# Patient Record
Sex: FEMALE | Race: ASIAN | NOT HISPANIC OR LATINO | URBAN - METROPOLITAN AREA
[De-identification: names, ages, dates, MRNs, and addresses within clinical notes are randomized per-mention and may not be internally consistent; named-entity substitution may affect disease eponyms.]

---

## 2021-12-21 ENCOUNTER — OFFICE VISIT (OUTPATIENT)
Dept: FAMILY MEDICINE | Facility: CLINIC | Age: 41
End: 2021-12-21
Payer: MEDICAID

## 2021-12-21 VITALS
TEMPERATURE: 98.3 F | DIASTOLIC BLOOD PRESSURE: 112 MMHG | WEIGHT: 141 LBS | HEIGHT: 61 IN | RESPIRATION RATE: 18 BRPM | OXYGEN SATURATION: 96 % | HEART RATE: 70 BPM | SYSTOLIC BLOOD PRESSURE: 159 MMHG | BODY MASS INDEX: 26.62 KG/M2

## 2021-12-21 DIAGNOSIS — H57.9 ITCH OF LEFT EYE: ICD-10-CM

## 2021-12-21 DIAGNOSIS — I10 HYPERTENSION, UNSPECIFIED TYPE: ICD-10-CM

## 2021-12-21 DIAGNOSIS — M53.3 PAIN IN THE COCCYX: ICD-10-CM

## 2021-12-21 DIAGNOSIS — H72.92 PERFORATION OF TYMPANIC MEMBRANE, LEFT: Primary | ICD-10-CM

## 2021-12-21 LAB
ANION GAP SERPL CALCULATED.3IONS-SCNC: 10 MMOL/L (ref 5–18)
BUN SERPL-MCNC: 10 MG/DL (ref 8–22)
CALCIUM SERPL-MCNC: 8.8 MG/DL (ref 8.5–10.5)
CHLORIDE BLD-SCNC: 105 MMOL/L (ref 98–107)
CO2 SERPL-SCNC: 24 MMOL/L (ref 22–31)
CREAT SERPL-MCNC: 0.64 MG/DL (ref 0.6–1.1)
GFR SERPL CREATININE-BSD FRML MDRD: >90 ML/MIN/1.73M2
GLUCOSE BLD-MCNC: 91 MG/DL (ref 70–125)
POTASSIUM BLD-SCNC: 3.4 MMOL/L (ref 3.5–5)
SODIUM SERPL-SCNC: 139 MMOL/L (ref 136–145)

## 2021-12-21 PROCEDURE — 99204 OFFICE O/P NEW MOD 45 MIN: CPT | Mod: GC | Performed by: STUDENT IN AN ORGANIZED HEALTH CARE EDUCATION/TRAINING PROGRAM

## 2021-12-21 PROCEDURE — 80048 BASIC METABOLIC PNL TOTAL CA: CPT | Performed by: STUDENT IN AN ORGANIZED HEALTH CARE EDUCATION/TRAINING PROGRAM

## 2021-12-21 PROCEDURE — 36415 COLL VENOUS BLD VENIPUNCTURE: CPT | Performed by: STUDENT IN AN ORGANIZED HEALTH CARE EDUCATION/TRAINING PROGRAM

## 2021-12-21 RX ORDER — ACETAMINOPHEN 325 MG/1
325-650 TABLET ORAL EVERY 6 HOURS PRN
Qty: 90 TABLET | Refills: 1 | Status: SHIPPED | OUTPATIENT
Start: 2021-12-21 | End: 2022-03-21

## 2021-12-21 RX ORDER — LOSARTAN POTASSIUM AND HYDROCHLOROTHIAZIDE 12.5; 5 MG/1; MG/1
1 TABLET ORAL DAILY
Qty: 30 TABLET | Refills: 0 | Status: SHIPPED | OUTPATIENT
Start: 2021-12-21 | End: 2022-03-01

## 2021-12-21 RX ORDER — LOSARTAN POTASSIUM 25 MG/1
25 TABLET ORAL DAILY
Qty: 30 TABLET | Refills: 0 | Status: CANCELLED | OUTPATIENT
Start: 2021-12-21

## 2021-12-21 RX ORDER — IBUPROFEN 200 MG
200-400 TABLET ORAL EVERY 4 HOURS PRN
Qty: 90 TABLET | Refills: 1 | Status: SHIPPED | OUTPATIENT
Start: 2021-12-21 | End: 2022-03-21

## 2021-12-21 NOTE — PATIENT INSTRUCTIONS
Thank you for discussing your health with us today!    We discussed the following during your visit:    1. Ear pain: I have sent you to an ear   2. Eye itching/draining: try the eye drop for the itching. I think you have a blocked duct   3. Back Pain: You have a bruised tailbone. This should get better in the next week or so. Try sitting on donut cushions  4. Blood pressure: your blood pressure is very high. Please start taking this blood pressure medication every day    Please make an appointment in 3-4 weeks to follow up on blood pressure.    As always, please call the clinic if you have any questions or concerns.    Patient Education     Understanding Coccydynia    Coccydynia is pain at the lowest tip of the spine (the coccyx, or tailbone). This is sometimes called a  bruised tailbone.  Tailbone pain can be very uncomfortable. It can also interfere with daily activities, such as driving.    How to say it  giovanni si DIN ee uh  What causes coccydynia?   Causes of tailbone pain include:    Injury to the tailbone from a blow or fall    A bone spur on the tailbone    Poor posture while sitting    Sitting for a long time in an uncomfortable position    Vaginal childbirth    Arthritis  In some cases, the cause of the pain can t be found.   Symptoms of coccydynia   You may feel:     A dull ache or sharp pain in the tailbone area, near the top of the buttocks    Muscle spasms in the lower back or pelvic area    A sense of pressure in the rectum  Pain may be triggered by things like walking or standing up from sitting. It may hurt more if you sit for long periods. Things that put pressure on the tailbone, such as riding a horse or having sex, may also trigger the pain.   Treatment for coccydynia   Tailbone pain often goes away by itself within a few months. Treatment focuses on reducing pain and protecting the tailbone. Treatments can include:     Over-the-counter or prescription medicine to help relieve pain and swelling.  NSAIDs (nonsteroidal anti-inflammatory drugs) are the most common medicines used. Medicines may be prescribed or bought over the counter. They may be given as pills. Or they may be put on the skin as a gel, cream, or patch.    Warm or cold to help relieve symptoms for a time, such as a heating pad, hot water bottle, or ice pack    Keeping pressure off the tailbone to help the area heal by shifting weight forward onto your hipbones and thighs when sitting or sitting on a special cushion    Medicine injected into the area to help relieve severe symptoms. The medicine is usually a corticosteroid. This is a strong anti-inflammatory medicine.    Physical therapy to help strengthen the structures around the tailbone  If no other treatments work, you may need surgery to remove all or part of the coccyx.   When to call your healthcare provider   Call your healthcare provider right away if you have any of these:     Pain that continues for more than 2 months or gets worse    Pain that limits your usual activities    Pain that doesn t get better by trying the self-care treatments described above    Fever of 100.4 F (38 C) or higher, or as directed by your provider    Chills    Redness or swelling    A new sore in the cleft of the buttocks, especially one that contains or drains pus    Other new symptoms  [employer] last reviewed this educational content on 6/1/2019 2000-2021 [employer]. All rights reserved. This information is not intended as a substitute for professional medical care. Always follow your healthcare professional's instructions.             12/21/21   OTOLARYNGOLOGY REFERRAL     [facility]  Phone: [phone]  Fax: [phone]    [facility]  [address] [place] [address], [address]  [place] [address]    Appointment:  01/19/2022  Arrival Time:  10:05AM  Provider:  Dr. Enriquez    Please bring a copy of your insurance card and photo ID    If you cannot make  this appointment, please call [phone] to reschedule    Referral, demographics and office note faxed to [phone].     Radha Vance

## 2021-12-21 NOTE — PROGRESS NOTES
Preceptor Attestation:    I discussed the patient with the resident and evaluated the patient in person. I have verified the content of the note, which accurately reflects my assessment of the patient and the plan of care.   Supervising Physician:  Phil Moore MD.

## 2021-12-21 NOTE — PROGRESS NOTES
Assessment & Plan     Perforation of tympanic membrane, left  Likely chronic with drainage. Clear hearing loss. Will not treat with antibiotics given chronicity.   - Otolaryngology Referral; Future    Hypertension, unspecified type  Severe range blood pressure readings, will diagnose with hypertension despite first time elevated reading. Will recheck BM in 3 weeks.  - Basic metabolic panel  - losartan-hydrochlorothiazide (HYZAAR) 50-12.5 MG tablet; Take 1 tablet by mouth daily    Itch of left eye  Likely plugged lacrimal duct. Will start with Zaditor. Discussed warm compress/massage.  - ketotifen (ZADITOR) 0.025 % ophthalmic solution; Place 1 drop Into the left eye 2 times daily    Pain in the coccyx  Likely bruise given improving pain and mild-moderate pain to palpation. Discussed sitting on cushion/donut for pain relief.  - acetaminophen (TYLENOL) 325 MG tablet; Take 1-2 tablets (325-650 mg) by mouth every 6 hours as needed for mild pain or headaches  - ibuprofen (ADVIL/MOTRIN) 200 MG tablet; Take 1-2 tablets (200-400 mg) by mouth every 4 hours as needed for mild pain or moderate pain    SDOH; non-English speaking     See Patient Instructions    No follow-ups on file.    Yohan Cotton MD  [facility]    Subjective   Hsar is a 41 year old who presents for the following health issues  accompanied by her daughter and .    HPI     Patient is here to discuss ear pain and back pain. She has moved from [place] 3 months ago. She did not have insurance for a while in [place] so she has not been to the doctor for her ear pain in many months.    She has sharp pain in her left ear and she believes that she is losing her hearing. She has had this for years but in the last 2-3 months the ear pain has gotten worse. She has tried a pill and an ear drop in the past which helped. When she stopped taking them her ear got worse again. She does not remember the names of these medications. She  previously had blood draining from her ear in [place] but none since coming to [place]. NO dizziness or weakness.     She fell going to work and has had back pain with difficulty walking immediately after standing. The back pain is located starting from her tailbone and going up. She slipped and fell on her bottom. She couldn't get up immediately and had to be pulled up. The pain has gotten better but it still hurts to sit and get up. No weakness, numbness, tingling in lower extremities. No saddle anesthesia. No change in urinary or bowel movements. She has not tried anything.     Her left eye has been tearing and itching for the last 2 months. No bloody or purulent drainage. The eye is not painful.     She denies taking any other medication and denies any other medical problems. She does not remember her clinic in [place]. She has received her covid vaccine.    Review of Systems   Constitutional, HEENT, cardiovascular, pulmonary, gi and gu systems are negative, except as otherwise noted.      Objective    BP (!) 159/112 (BP Location: Left arm, Patient Position: Sitting, Cuff Size: Adult Regular)   Pulse 70   Temp 98.3  F (36.8  C) (Tympanic)   Resp 18   Wt 64 kg (141 lb)   LMP 12/10/2021 (Approximate)   SpO2 96%   There is no height or weight on file to calculate BMI.  Physical Exam   GENERAL: healthy, alert and no distress.   HEENT: Right TM with scarring, Left TM with perforation, clear drainage, no erythema or purelence, possible cholesteatoma.  Decreased hearing on left. Patient turns right ear towards phone , unable to hear with left ear  Eyes: PERRLA, EOMI, no conjunctivitis. Left eye with clear drainage.   NECK: no adenopathy, no asymmetry, masses, or scars and thyroid normal to palpation  RESP: lungs clear to auscultation - no rales, rhonchi or wheezes  CV: regular rate and rhythm, normal S1 S2, no S3 or S4, no murmur, click or rub, no peripheral edema and peripheral pulses strong  ABDOMEN:  soft, nontender, no hepatosplenomegaly, no masses and bowel sounds normal  Back: tenderness over coccyx, increased with flexion. Moderate. Intact strength and sensation  MS: no gross musculoskeletal defects noted, no edema      ----- Service Performed and Documented by Resident or Fellow ------

## 2021-12-21 NOTE — LETTER
December 27, 2021      Hsar Say  [address] [place] [address]        Dear Ms.Say,    We are writing to inform you of your test results.    Which is good news! The laboratory results show that your kidneys are working normally. Please return in 3 weeks and we will test your kidneys with your new high blood pressure medication. Please let me know if you have any other questions or concerns.        Resulted Orders   Basic metabolic panel   Result Value Ref Range    Sodium 139 136 - 145 mmol/L    Potassium 3.4 (L) 3.5 - 5.0 mmol/L    Chloride 105 98 - 107 mmol/L    Carbon Dioxide (CO2) 24 22 - 31 mmol/L    Anion Gap 10 5 - 18 mmol/L    Urea Nitrogen 10 8 - 22 mg/dL    Creatinine 0.64 0.60 - 1.10 mg/dL    Calcium 8.8 8.5 - 10.5 mg/dL    Glucose 91 70 - 125 mg/dL    GFR Estimate >90 >60 mL/min/1.73m2      Comment:      Effective December 21, 2021 eGFRcr in adults is calculated using the 2021 CKD-EPI creatinine equation which includes age and gender ( et al., NEJM, DOI: 10.1056/DTDSef8111699)       If you have any questions or concerns, please call the clinic at the number listed above.       Sincerely,      Yohan Cotton MD

## 2021-12-21 NOTE — NURSING NOTE
Due to patient being non-English speaking/uses sign language, an  was used for this visit. Only for face-to-face interpretation by an external agency, date and length of interpretation can be found on the scanned worksheet.     name: Erum          ID:[id number]  Agency: [facility] [employer] Phone Interpreting  Language: Vinnie   Telephone number: [phone]  Type of interpretation: Telephone, spoken

## 2021-12-23 NOTE — RESULT ENCOUNTER NOTE
Madeline Campos    Please call the following patient with the results below. Thank you!    Madeline Bhat,    I hope you're well. I wanted to communicate with you the results of the tests that we did.     Which is good news! The laboratory results show that your kidneys are working normally. Please return in 3 weeks and we will test your kidneys with your new high blood pressure medication. Please let me know if you have any other questions or concerns.     Thank you!  Yohan Cotton, PGY3

## 2022-03-01 ENCOUNTER — OFFICE VISIT (OUTPATIENT)
Dept: FAMILY MEDICINE | Facility: CLINIC | Age: 42
End: 2022-03-01
Payer: COMMERCIAL

## 2022-03-01 VITALS
WEIGHT: 146.6 LBS | OXYGEN SATURATION: 100 % | RESPIRATION RATE: 16 BRPM | BODY MASS INDEX: 28.16 KG/M2 | TEMPERATURE: 98.6 F | HEART RATE: 69 BPM | SYSTOLIC BLOOD PRESSURE: 154 MMHG | DIASTOLIC BLOOD PRESSURE: 101 MMHG

## 2022-03-01 DIAGNOSIS — I10 HYPERTENSION, UNSPECIFIED TYPE: ICD-10-CM

## 2022-03-01 DIAGNOSIS — H72.92 PERFORATION OF LEFT TYMPANIC MEMBRANE: Primary | ICD-10-CM

## 2022-03-01 PROCEDURE — 99214 OFFICE O/P EST MOD 30 MIN: CPT | Mod: GC | Performed by: FAMILY MEDICINE

## 2022-03-01 RX ORDER — LOSARTAN POTASSIUM AND HYDROCHLOROTHIAZIDE 12.5; 5 MG/1; MG/1
1 TABLET ORAL DAILY
Qty: 30 TABLET | Refills: 0 | Status: SHIPPED | OUTPATIENT
Start: 2022-03-01 | End: 2022-06-24

## 2022-03-01 RX ORDER — LOSARTAN POTASSIUM AND HYDROCHLOROTHIAZIDE 12.5; 5 MG/1; MG/1
1 TABLET ORAL DAILY
Qty: 30 TABLET | Refills: 0 | Status: SHIPPED | OUTPATIENT
Start: 2022-03-01 | End: 2022-03-01

## 2022-03-01 NOTE — NURSING NOTE
Due to patient being non-English speaking/uses sign language, an  was used for this visit. Only for face-to-face interpretation by an external agency, date and length of interpretation can be found on the scanned worksheet.     name: Edgardo Mccann  Agency: [employer]  Language: Makayla   Telephone number: [phone]  Type of interpretation: Face-to-face, spoken

## 2022-03-01 NOTE — PROGRESS NOTES
Preceptor Attestation:    I discussed the patient with the resident and evaluated the patient in person. I have verified the content of the note, which accurately reflects my assessment of the patient and the plan of care.   Supervising Physician:  Tate Norris MD.

## 2022-03-01 NOTE — PROGRESS NOTES
[facility]    Assessment/Plan:    Hypertension, unspecified type  Hadn't gotten meds from last pharmacy- would like to change to pharmacy that she can walk to   -RTC in 2 weeks for repeat BP and labs  - losartan-hydrochlorothiazide (HYZAAR) 50-12.5 MG tablet  Dispense: 30 tablet; Refill: 0    Perforation of left tympanic membrane  Sounds chronic in duration for years- not currently having drainage so deferred abx drops  Has car but doesn't know how to use GPS, not many friends/supports in the area yet ([place]), so will need help getting to the ENT office  - Otolaryngology Referral      Bria Rider MD  PGY3, Family Medicine    I staffed with Dr. Norris, who agrees with my assessment and plan.    Diagnosis or treatment significantly limited by social determinants of health - poor access to healthcare system, lack of transportation, language barrier  Prescription drug management       Hsar Say is a 42 year old female with a PMH of   There is no problem list on file for this patient.    presenting to clinic today with a chief complaint of:    Patient presents with:  Otalgia: bilateral, started on left.      Never got the hydrochlorothiazide lisinopril pills from the pharmacy, not sure why.    Was seen for ear drainage in December from the L ear. Since she was 15 years old she has had air pain on the L side. The right side started about two weeks ago. Feels like an achey pain, it's a mild pain. Hearing loss on the left side which has been getting worse over the past year. No current drainage from either ear. History of an injury to the L ear when playing with friends as a kid, and after that had frequent bleeding from the ear as a kid.  Hasn't had bleeding from the ear in some time. Hasn't been swimming recently, but when goes swimming has pain and fullness on that hear.         Current Outpatient Medications   Medication Sig Dispense Refill      losartan-hydrochlorothiazide (HYZAAR) 50-12.5 MG tablet Take 1 tablet by mouth daily 30 tablet 0     acetaminophen (TYLENOL) 325 MG tablet Take 1-2 tablets (325-650 mg) by mouth every 6 hours as needed for mild pain or headaches (Patient not taking: Reported on 3/1/2022) 90 tablet 1     ibuprofen (ADVIL/MOTRIN) 200 MG tablet Take 1-2 tablets (200-400 mg) by mouth every 4 hours as needed for mild pain or moderate pain (Patient not taking: Reported on 3/1/2022) 90 tablet 1     ketotifen (ZADITOR) 0.025 % ophthalmic solution Place 1 drop Into the left eye 2 times daily (Patient not taking: Reported on 3/1/2022) 5 mL 1       O: BP (!) 154/101   Pulse 69   Temp 98.6  F (37  C)   Resp 16   Wt 66.5 kg (146 lb 9.6 oz)   SpO2 100%   BMI 28.16 kg/m     Gen:  Well nourished and in no acute distress   HEENT: PERRL;  nasopharynx pink and moist; oropharynx pink and moist. R TM normal light reflex, gray, negative for pinna manipulation pain and ear canal not erythematous. L TM with chronic appearing perforation without exudate, no canal erythema, minimal pain with pinna manipulation.   Respiratory: normal work of breathing  Psych: Euthymic     This note was created using Dragon dictation system. Typos are not purposeful.

## 2022-03-01 NOTE — PATIENT INSTRUCTIONS
03/01/22   OTOLARYNGOLOGY REFERRAL  [facility]   Phone: [phone]  Fax: [phone]    [facility]   [address] [place] [id number]    Appointment:  03/10/2022  Arrival Time: 2:00PM  Provider: Dayanara Juan & Sheeren Vasquez    Please bring a copy of insurance card and photo ID    If you cannot make this appointment please call [phone] to reschedule    Referral, demographics and office note faxed to [phone].     Radha Vance

## 2022-03-15 DIAGNOSIS — I10 HYPERTENSION, UNSPECIFIED TYPE: Primary | ICD-10-CM

## 2022-03-15 DIAGNOSIS — Z00.00 PERIODIC HEALTH ASSESSMENT, GENERAL SCREENING, ADULT: ICD-10-CM

## 2022-03-21 ENCOUNTER — OFFICE VISIT (OUTPATIENT)
Dept: FAMILY MEDICINE | Facility: CLINIC | Age: 42
End: 2022-03-21
Payer: COMMERCIAL

## 2022-03-21 VITALS
OXYGEN SATURATION: 99 % | DIASTOLIC BLOOD PRESSURE: 101 MMHG | RESPIRATION RATE: 20 BRPM | WEIGHT: 145.4 LBS | BODY MASS INDEX: 27.93 KG/M2 | SYSTOLIC BLOOD PRESSURE: 148 MMHG | TEMPERATURE: 98.7 F | HEART RATE: 82 BPM

## 2022-03-21 DIAGNOSIS — H92.02 LEFT EAR PAIN: ICD-10-CM

## 2022-03-21 DIAGNOSIS — M54.6 ACUTE BILATERAL THORACIC BACK PAIN: ICD-10-CM

## 2022-03-21 DIAGNOSIS — I10 ESSENTIAL HYPERTENSION, BENIGN: Primary | ICD-10-CM

## 2022-03-21 PROCEDURE — 99214 OFFICE O/P EST MOD 30 MIN: CPT | Mod: GC

## 2022-03-21 RX ORDER — ACETAMINOPHEN 325 MG/1
325-650 TABLET ORAL EVERY 6 HOURS PRN
Qty: 90 TABLET | Refills: 1 | Status: SHIPPED | OUTPATIENT
Start: 2022-03-21

## 2022-03-21 RX ORDER — LOSARTAN POTASSIUM AND HYDROCHLOROTHIAZIDE 25; 100 MG/1; MG/1
1 TABLET ORAL DAILY
Qty: 90 TABLET | Refills: 1 | Status: SHIPPED | OUTPATIENT
Start: 2022-03-21

## 2022-03-21 RX ORDER — IBUPROFEN 200 MG
200-400 TABLET ORAL EVERY 4 HOURS PRN
Qty: 90 TABLET | Refills: 1 | Status: SHIPPED | OUTPATIENT
Start: 2022-03-21

## 2022-03-21 NOTE — NURSING NOTE
Due to patient being non-English speaking/uses sign language, an  was used for this visit. Only for face-to-face interpretation by an external agency, date and length of interpretation can be found on the scanned worksheet.     name: Bry Valdovinos  Agency: [employer]  Language: Christiano   Telephone number: [phone]  Type of interpretation: Face-to-face, spoken

## 2022-03-21 NOTE — PROGRESS NOTES
[facility] Clinic Visit    Assessment & Plan   Essential hypertension  Patient's blood pressure was 148/101 in clinic today.  She has been taking her medicine consistently but does not check her blood pressure at home.  She has been tolerating the Hyzaar well without side effects.  -Increase Hyzaar to 100 mg/25 mg    Ear pain, L ear  Hearing loss, L ear  Patient has been following with ENT.  Encouraged patient to continue following with ENT.  Patient has continued to have pain in the ears so we will refill acetaminophen and ibuprofen for her today.  - Refill acetaminophen and ibuprofen    Back pain  Patient has been having back pain for about 2 weeks.  It is located in the low to middle back bilaterally.  No known trauma.  Not particularly tender on exam.  No neurologic signs or symptoms.  Likely musculoskeletal based on exam and history.  -Acetaminophen and ibuprofen as above    RTC in 2 weeks for follow up of blood pressure and CPE or sooner if develops new or worsening symptoms.    Options for treatment and follow-up care were reviewed with the patient who was engaged and actively involved in the decision making process, verbalized understanding of the options discussed, and satisfied with the final plan.    Patient was staffed with supervising physician, Dr. Nam.     Frank Wong MD, PGY1  [facility]    Subjective   Hsar Say is a 42 year old female with a history including hypertension who presents with back pain, follow up HTN, and ear pain hearing loss.   Back pain  Onset: 2 weeks  Location: lower back  Trauma: No  Radiation: No  Quality: Tight/stiff, worse in the morning.   Aggravating factors: Worse with activity  Alleviating factors: Sitting/rest  Bowel/Bladder concerns: No  Cancer history: No  Fractures: No  Leg numbness/tingling: No    Hearing loss  Saw ENT recently who has ordered imaging. Continues to have ear pain when she is not taking ibuprofen or acetaminophen.  Still has decreased hearing out of her left ear.     Hypertension Follow-up  <130/80    Outpatient blood pressures are not being checked.    Chest Pain? :No     Low Salt Diet: not monitoring salt    Daily NSAID Use?No     Did patient take their HTN pills today/last night as usual?  Yes    Last Basic Metabolic Panel:  Lab Results   Component Value Date     12/21/2021      Lab Results   Component Value Date    POTASSIUM 3.4 12/21/2021     Lab Results   Component Value Date    CHLORIDE 105 12/21/2021     Lab Results   Component Value Date    MEGAN 8.8 12/21/2021     Lab Results   Component Value Date    CO2 24 12/21/2021     Lab Results   Component Value Date    BUN 10 12/21/2021     Lab Results   Component Value Date    CR 0.64 12/21/2021     Lab Results   Component Value Date    GLC 91 12/21/2021       Adherence and Exercise  Medication side effects: no  How often is a medication missed? 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      Social: She reports that she has never smoked. She has never used smokeless tobacco. She reports that she does not drink alcohol and does not use drugs.    Nursing Notes:   Qamar Salazar CMA  3/21/2022  4:21 PM  Sign at exiting of workspace      Due to patient being non-English speaking/uses sign language, an  was used for this visit. Only for face-to-face interpretation by an external agency, date and length of interpretation can be found on the scanned worksheet.     name: Bry Valdovinos  Agency: [employer]  [facility]   Telephone number: [phone]  Type of interpretation: Face-to-face, spoken          Objective     Vitals:    03/21/22 1525 03/21/22 1527 03/21/22 1529 03/21/22 1624   BP: (!) 166/94 (!) 162/113 (!) 150/69 (!) 148/101   Pulse: 85 81 85 82   Resp:    20   Temp:    98.7  F (37.1  C)   TempSrc:    Oral   SpO2:    99%   Weight:    66 kg (145 lb 6.4 oz)     Body mass index  is 27.93 kg/m .    GEN: NAD, healthy, alert  Constitutional: Awake, alert, cooperative, no acute distress, and appears stated age.  HEENT: NC/AT, EOMI, normal conjunctivae/sclerae.  Right ear: TM clear.  Left ear: Scarring of the TM and possible cholesteatoma.  Back: Symmetric, no curvature  Lungs: No increased WOB. CTABL, no crackles or wheezing appreciated.  Cardiovascular: Appears well perfused. RRR, normal S1 and S2, no S3 or S4, and no murmur appreciated.  Musculoskeletal: Mild paraspinal muscle tenderness.  No tenderness over the spinous processes.  No CVA tenderness.  Neurologic: Sensory is intact and gait is normal.  Skin: No visible rashes, erythema, pallor, petechia or purpura.    Labs:  No visits with results within 1 Month(s) from this visit.   Latest known visit with results is:   Office Visit on 12/21/2021   Component Date Value Ref Range Status     Sodium 12/21/2021 139  136 - 145 mmol/L Final     Potassium 12/21/2021 3.4 (A) 3.5 - 5.0 mmol/L Final     Chloride 12/21/2021 105  98 - 107 mmol/L Final     Carbon Dioxide (CO2) 12/21/2021 24  22 - 31 mmol/L Final     Anion Gap 12/21/2021 10  5 - 18 mmol/L Final     Urea Nitrogen 12/21/2021 10  8 - 22 mg/dL Final     Creatinine 12/21/2021 0.64  0.60 - 1.10 mg/dL Final     Calcium 12/21/2021 8.8  8.5 - 10.5 mg/dL Final     Glucose 12/21/2021 91  70 - 125 mg/dL Final     GFR Estimate 12/21/2021 >90  >60 mL/min/1.73m2 Final    Effective December 21, 2021 eGFRcr in adults is calculated using the 2021 CKD-EPI creatinine equation which includes age and gender (Nusrat et al., NEJ, DOI: 10.1056/YJVLex2503219)

## 2022-03-21 NOTE — PATIENT INSTRUCTIONS
I increased your dose of blood pressure medicine today. So you can start taking the higher dose. You can take two of your current pill at home until they are gone. Then you can take one per day of your new dose.     You can keep taking the acetaminophen and ibuprofen for ear and back pain.

## 2022-03-21 NOTE — PROGRESS NOTES
Preceptor Attestation:    I discussed the patient with the resident and evaluated the patient in person. I have verified the content of the note, which accurately reflects my assessment of the patient and the plan of care.   Supervising Physician:  Epifanio Nam MD.

## 2022-06-24 ENCOUNTER — OFFICE VISIT (OUTPATIENT)
Dept: FAMILY MEDICINE | Facility: CLINIC | Age: 42
End: 2022-06-24
Payer: COMMERCIAL

## 2022-06-24 VITALS
DIASTOLIC BLOOD PRESSURE: 100 MMHG | HEART RATE: 82 BPM | SYSTOLIC BLOOD PRESSURE: 170 MMHG | OXYGEN SATURATION: 99 % | RESPIRATION RATE: 16 BRPM | BODY MASS INDEX: 28.54 KG/M2 | WEIGHT: 148.6 LBS | TEMPERATURE: 99 F

## 2022-06-24 DIAGNOSIS — G89.29 CHRONIC MIDLINE LOW BACK PAIN WITHOUT SCIATICA: Primary | ICD-10-CM

## 2022-06-24 DIAGNOSIS — Z00.00 PREVENTATIVE HEALTH CARE: ICD-10-CM

## 2022-06-24 DIAGNOSIS — M54.50 CHRONIC MIDLINE LOW BACK PAIN WITHOUT SCIATICA: Primary | ICD-10-CM

## 2022-06-24 DIAGNOSIS — I10 ESSENTIAL HYPERTENSION, BENIGN: ICD-10-CM

## 2022-06-24 LAB
ALBUMIN SERPL-MCNC: 3.6 G/DL (ref 3.5–5)
ALP SERPL-CCNC: 62 U/L (ref 45–120)
ALT SERPL W P-5'-P-CCNC: 25 U/L (ref 0–45)
ANION GAP SERPL CALCULATED.3IONS-SCNC: 8 MMOL/L (ref 5–18)
AST SERPL W P-5'-P-CCNC: 26 U/L (ref 0–40)
BILIRUB SERPL-MCNC: 0.3 MG/DL (ref 0–1)
BUN SERPL-MCNC: 13 MG/DL (ref 8–22)
CALCIUM SERPL-MCNC: 8.9 MG/DL (ref 8.5–10.5)
CHLORIDE BLD-SCNC: 105 MMOL/L (ref 98–107)
CHOLEST SERPL-MCNC: 190 MG/DL
CO2 SERPL-SCNC: 25 MMOL/L (ref 22–31)
CREAT SERPL-MCNC: 0.69 MG/DL (ref 0.6–1.1)
FASTING STATUS PATIENT QL REPORTED: ABNORMAL
GFR SERPL CREATININE-BSD FRML MDRD: >90 ML/MIN/1.73M2
GLUCOSE BLD-MCNC: 95 MG/DL (ref 70–125)
HBA1C MFR BLD: 6.4 % (ref 0–5.6)
HDLC SERPL-MCNC: 42 MG/DL
HIV 1+2 AB+HIV1 P24 AG SERPL QL IA: NEGATIVE
LDLC SERPL CALC-MCNC: 102 MG/DL
POTASSIUM BLD-SCNC: 3.8 MMOL/L (ref 3.5–5)
PROT SERPL-MCNC: 7.7 G/DL (ref 6–8)
SODIUM SERPL-SCNC: 138 MMOL/L (ref 136–145)
TRIGL SERPL-MCNC: 229 MG/DL

## 2022-06-24 PROCEDURE — 36415 COLL VENOUS BLD VENIPUNCTURE: CPT | Performed by: STUDENT IN AN ORGANIZED HEALTH CARE EDUCATION/TRAINING PROGRAM

## 2022-06-24 PROCEDURE — 80061 LIPID PANEL: CPT | Performed by: STUDENT IN AN ORGANIZED HEALTH CARE EDUCATION/TRAINING PROGRAM

## 2022-06-24 PROCEDURE — 86803 HEPATITIS C AB TEST: CPT | Performed by: STUDENT IN AN ORGANIZED HEALTH CARE EDUCATION/TRAINING PROGRAM

## 2022-06-24 PROCEDURE — 99214 OFFICE O/P EST MOD 30 MIN: CPT | Mod: GC | Performed by: STUDENT IN AN ORGANIZED HEALTH CARE EDUCATION/TRAINING PROGRAM

## 2022-06-24 PROCEDURE — 83036 HEMOGLOBIN GLYCOSYLATED A1C: CPT | Performed by: STUDENT IN AN ORGANIZED HEALTH CARE EDUCATION/TRAINING PROGRAM

## 2022-06-24 PROCEDURE — 80053 COMPREHEN METABOLIC PANEL: CPT | Performed by: STUDENT IN AN ORGANIZED HEALTH CARE EDUCATION/TRAINING PROGRAM

## 2022-06-24 PROCEDURE — 87389 HIV-1 AG W/HIV-1&-2 AB AG IA: CPT | Performed by: STUDENT IN AN ORGANIZED HEALTH CARE EDUCATION/TRAINING PROGRAM

## 2022-06-24 RX ORDER — AMLODIPINE BESYLATE 5 MG/1
5 TABLET ORAL DAILY
Qty: 30 TABLET | Refills: 0 | Status: SHIPPED | OUTPATIENT
Start: 2022-06-24

## 2022-06-24 NOTE — PROGRESS NOTES
"[facility] Visit    Assessment & Plan   1. Chronic midline low back pain without sciatica  Not tender on exam. Likely early arthritis. Stop Ibuprofen. Only tylenol. Added diclofenac gel.   - diclofenac (VOLTAREN) 1 % topical gel; Apply 4 g topically 4 times daily  Dispense: 350 g; Refill: 1  - Physical Therapy Referral; Future    2. Preventative health care  - Hepatitis C antibody; Future  - HIV Ag/Ab Screen Cascade; Future  - Lipids  - A1C    3. Essential hypertension, benign  170s despite high dose hyzaar. Will add amlodipine. Check labs.   - Home Blood Pressure Monitor Order  - amLODIPine (NORVASC) 5 MG tablet; Take 1 tablet (5 mg) by mouth daily  Dispense: 30 tablet; Refill: 0  - [facility]    Options for treatment and follow-up care were reviewed with the patient who was engaged and actively involved in the decision making process, verbalized understanding of the options discussed, and satisfied with the final plan.    Patient was staffed with supervising physician, Dr. Moore.     Isamar Gerard MD, PGY3  [facility]    Subjective   Hsar Say is a 42 year old female with a history including HTN who presents for back and ear pain.    Back pain  Midline lower back, hard to bend down and get up. She is \"stiff.\" Worse in the morning. No loss of bowel or bladder function. Tylenol and ibuprofen helpful. Uses ibuprofen twice a day. No history of falls / trauma.    Ear pain  History of left TM perforation. Has not seen ENT.     BP  Elevated today. On Hyzaar 100/25. Body feels \"weak\" after she takes the medicine. She does not check her blood pressure at home.       Social: She reports that she has never smoked. She has never used smokeless tobacco. She reports that she does not drink alcohol and does not use drugs.    Nursing Notes:   Jeannie Lria  6/24/2022  2:15 PM  Signed  Due to patient being non-English speaking/uses sign language, an  was used for this visit. Only for " face-to-face interpretation by an external agency, date and length of interpretation can be found on the scanned worksheet.     name: ID   Agency: [employer]   Language: Christiano   Telephone number: [phone]  Type of interpretation: Telephone, spoken      Objective     Vitals:    06/24/22 1411 06/24/22 1420   BP: (!) 161/103 (!) 170/100   BP Location: Left arm Left arm   Patient Position: Sitting Sitting   Cuff Size: Adult Regular Adult Regular   Pulse: 82    Resp: 16    Temp: 99  F (37.2  C)    TempSrc: Oral    SpO2: 99%    Weight: 67.4 kg (148 lb 9.6 oz)      Body mass index is 28.54 kg/m .    GEN: NAD, healthy, alert  Constitutional: Awake, alert, cooperative, no acute distress, and appears stated age.  HEENT: NC/AT, EOMI, normal conjunctivae/sclerae, mask on  Back: Symmetric, no curvature. No tenderness throughout. Strength intact.   Lungs: No increased WOB. CTABL, no crackles or wheezing appreciated.  Cardiovascular: Appears well perfused. RRR, normal S1 and S2, no S3 or S4, and no murmur appreciated.  Abdomen: Normal BS, soft, non-distended, non-tender, no masses palpated  Musculoskeletal: No redness, warmth, or swelling of the joints. Tone is normal.  Neurologic: A&Ox3.  Cranial nerves II-XII are grossly intact.  Sensory is intact and gait is normal.  Neuropsychiatric: Normal affect, mood, orientation, memory and insight.  Skin: No visible rashes, erythema, pallor, petechia or purpura.    Labs:  No visits with results within 1 Month(s) from this visit.   Latest known visit with results is:   Office Visit on 12/21/2021   Component Date Value Ref Range Status     Sodium 12/21/2021 139  136 - 145 mmol/L Final     Potassium 12/21/2021 3.4 (A) 3.5 - 5.0 mmol/L Final     Chloride 12/21/2021 105  98 - 107 mmol/L Final     Carbon Dioxide (CO2) 12/21/2021 24  22 - 31 mmol/L Final     Anion Gap 12/21/2021 10  5 - 18 mmol/L Final     Urea Nitrogen 12/21/2021 10  8 - 22 mg/dL Final     Creatinine 12/21/2021 0.64   0.60 - 1.10 mg/dL Final     Calcium 12/21/2021 8.8  8.5 - 10.5 mg/dL Final     Glucose 12/21/2021 91  70 - 125 mg/dL Final     GFR Estimate 12/21/2021 >90  >60 mL/min/1.73m2 Final    Effective December 21, 2021 eGFRcr in adults is calculated using the 2021 CKD-EPI creatinine equation which includes age and gender (Nusrat et al., NEJM, DOI: 10.1056/ISVPme6999915)       DME (Durable Medical Equipment) Orders and Documentation  Orders Placed This Encounter   Procedures     Home Blood Pressure Monitor Order      The patient was assessed and it was determined the patient is in need of the following listed DME Supplies/Equipment. Please complete supporting documentation below to demonstrate medical necessity.      DME All Other Item(s) Documentation    List reason for need and supporting documentation for medical necessity below for each DME item.     1. HTN

## 2022-06-24 NOTE — NURSING NOTE
Due to patient being non-English speaking/uses sign language, an  was used for this visit. Only for face-to-face interpretation by an external agency, date and length of interpretation can be found on the scanned worksheet.     name: ID   Agency: [employer]   Language: Christiano   Telephone number: [phone]  Type of interpretation: Telephone, spoken    --

## 2022-06-27 LAB — HCV AB SERPL QL IA: NEGATIVE

## 2022-06-28 NOTE — RESULT ENCOUNTER NOTE
"Hello,    Please call the following patient with the results below. Thank you!    Madeline Bhat,    I hope you're well. I wanted to communicate with you the results of the tests that we did.     The laboratory results show:  1. Your hepatitis C and HIV tests were normal.  2. Your \"good\" cholesterol is a little low and we would like this a little higher. The best way to improve this is to work on lifestyle modifications including less fat in your diet and especially increased exercise.   3. Your diabetes test shows that you have PRE-diabetes, not diabetes. We will need to check this test every year.   4. Your kidney and liver function are NORMAL.    Please let me know if you have any other questions or concerns.     Thank you!  Isamar Gerard MD PGY3  "